# Patient Record
Sex: FEMALE | Race: ASIAN | URBAN - METROPOLITAN AREA
[De-identification: names, ages, dates, MRNs, and addresses within clinical notes are randomized per-mention and may not be internally consistent; named-entity substitution may affect disease eponyms.]

---

## 2021-01-28 ENCOUNTER — OFFICE VISIT (OUTPATIENT)
Dept: URBAN - METROPOLITAN AREA CLINIC 91 | Facility: CLINIC | Age: 81
End: 2021-01-28
Payer: MEDICARE

## 2021-01-28 DIAGNOSIS — H11.151 PINGUECULA, RIGHT EYE: ICD-10-CM

## 2021-01-28 DIAGNOSIS — E11.9 TYPE 2 DIABETES MELLITUS WITHOUT COMPLICATIONS: ICD-10-CM

## 2021-01-28 PROCEDURE — 99212 OFFICE O/P EST SF 10 MIN: CPT | Performed by: SPECIALIST

## 2021-01-28 RX ORDER — TIMOLOL MALEATE 5 MG/ML
0.5 % SOLUTION/ DROPS OPHTHALMIC
Qty: 7.5 | Refills: 3 | Status: ACTIVE
Start: 2021-01-28

## 2021-01-28 RX ORDER — LATANOPROST 50 UG/ML
0.005 % SOLUTION OPHTHALMIC
Qty: 7.5 | Refills: 3 | Status: ACTIVE
Start: 2021-01-28

## 2021-01-28 RX ORDER — LATANOPROST 50 UG/ML
0.005 % SOLUTION OPHTHALMIC 4 TIMES DAILY
Refills: 0 | Status: INACTIVE
Start: 2021-01-28 | End: 2021-01-28

## 2021-01-28 RX ORDER — TIMOLOL MALEATE 5 MG/ML
0.5 % SOLUTION/ DROPS OPHTHALMIC
Qty: 7.5 | Refills: 3 | Status: INACTIVE
Start: 2021-01-28 | End: 2021-01-28

## 2021-01-28 ASSESSMENT — VISUAL ACUITY: OD: 20/40

## 2021-01-28 ASSESSMENT — INTRAOCULAR PRESSURE
OD: 12
OS: 11

## 2021-01-28 NOTE — IMPRESSION/PLAN
Impression: Dry eye syndrome of bilateral lacrimal glands: H04.123. Plan: Slight blurred vision most likely due to dry eye explained. For dry eye syndrome, I have recommended patient use a good quality artificial tear 4-6 times per day. I have also discussed alternatives such as placement of punctal plugs and topical Restasis drops.

## 2021-01-28 NOTE — IMPRESSION/PLAN
Impression: Type 2 diabetes mellitus without complications: K50.9. Plan: DM without signs of diabetic retinopathy. Diabetic eye disease and importance of regular eye exams and tight glucose control discussed.

## 2021-01-28 NOTE — IMPRESSION/PLAN
Impression: Pinguecula, right eye: H11.151. Plan: For Pinguecula OD I have recommended UV Protection when outdoors, either a hat or UV rated sunglasses. I explained to patient that these areas are more easily irritated by wind and sun exposure. Patient should also use a good quality artificial tears as needed for irritation.

## 2021-01-28 NOTE — IMPRESSION/PLAN
Impression: Primary open-angle glaucoma, bilateral, mild stage: H40.1131. Plan: Continue Latanoprost OU QHS + Timolol OU BID. Discussed primary open angle glaucoma. I have stressed importance of patient compliance with follow-up appointments and using glaucoma drops as directed. I have explained the risk of irreversible vision loss and possible blindness associated with glaucoma. Will continue to monitor patient's glaucoma status with OCTg, visual field testing and fundus photography. 


RV in 4 months for [facility] and OCTg exam.

## 2021-05-27 ENCOUNTER — OFFICE VISIT (OUTPATIENT)
Dept: URBAN - METROPOLITAN AREA CLINIC 91 | Facility: CLINIC | Age: 81
End: 2021-05-27
Payer: MEDICARE

## 2021-05-27 PROCEDURE — 92133 CPTRZD OPH DX IMG PST SGM ON: CPT | Performed by: SPECIALIST

## 2021-05-27 PROCEDURE — 99214 OFFICE O/P EST MOD 30 MIN: CPT | Performed by: SPECIALIST

## 2021-05-27 ASSESSMENT — INTRAOCULAR PRESSURE
OD: 11
OS: 12

## 2021-05-27 NOTE — IMPRESSION/PLAN
Impression: Primary open-angle glaucoma, bilateral, mild stage: H40.1131. Plan: IOP and OCTg are stable. Continue Latanoprost QHS OU and Timolol BID OU. Discussed primary open angle glaucoma. I have stressed importance of patient compliance with follow-up appointments and using glaucoma drops as directed. I have explained the risk of irreversible vision loss and possible blindness associated with glaucoma. Will continue to monitor patient's glaucoma status with OCTg, visual field testing and fundus photography.

## 2021-05-27 NOTE — IMPRESSION/PLAN
Impression: Type 2 diabetes mellitus without complications: L26.8. Plan: DM without signs of diabetic retinopathy. Diabetic eye disease and importance of regular eye exams and tight glucose control discussed.

## 2021-12-02 ENCOUNTER — OFFICE VISIT (OUTPATIENT)
Dept: URBAN - METROPOLITAN AREA CLINIC 91 | Facility: CLINIC | Age: 81
End: 2021-12-02
Payer: MEDICARE

## 2021-12-02 PROCEDURE — 92083 EXTENDED VISUAL FIELD XM: CPT | Performed by: SPECIALIST

## 2021-12-02 PROCEDURE — 99213 OFFICE O/P EST LOW 20 MIN: CPT | Performed by: SPECIALIST

## 2021-12-02 ASSESSMENT — INTRAOCULAR PRESSURE
OD: 18
OS: 15

## 2021-12-02 ASSESSMENT — VISUAL ACUITY
OS: 20/25
OD: 20/40

## 2021-12-02 NOTE — IMPRESSION/PLAN
Impression: Primary open-angle glaucoma, bilateral, mild stage: H40.1131. Plan: Borderline IOP's will recheck in 3 months if no improvement will consider changing glaucoma drops. VF No change. Handwritten Rx given to patient as requested. Discussed primary open angle glaucoma. I have stressed importance of patient compliance with follow-up appointments and using glaucoma drops as directed. I have explained the risk of irreversible vision loss and possible blindness associated with glaucoma. Will continue to monitor patient's glaucoma status with OCTg, visual field testing and fundus photography.

## 2022-03-03 ENCOUNTER — OFFICE VISIT (OUTPATIENT)
Dept: URBAN - METROPOLITAN AREA CLINIC 91 | Facility: CLINIC | Age: 82
End: 2022-03-03
Payer: MEDICARE

## 2022-03-03 DIAGNOSIS — H04.123 DRY EYE SYNDROME OF BILATERAL LACRIMAL GLANDS: ICD-10-CM

## 2022-03-03 DIAGNOSIS — H40.1131 PRIMARY OPEN-ANGLE GLAUCOMA, BILATERAL, MILD STAGE: Primary | ICD-10-CM

## 2022-03-03 PROCEDURE — 99214 OFFICE O/P EST MOD 30 MIN: CPT | Performed by: SPECIALIST

## 2022-03-03 PROCEDURE — 92133 CPTRZD OPH DX IMG PST SGM ON: CPT | Performed by: SPECIALIST

## 2022-03-03 ASSESSMENT — INTRAOCULAR PRESSURE
OD: 12
OS: 14

## 2022-03-03 ASSESSMENT — VISUAL ACUITY
OD: 20/40
OS: 20/25

## 2022-03-03 NOTE — IMPRESSION/PLAN
Impression: Primary open-angle glaucoma, bilateral, mild stage: H40.1131. Plan: OCTg and IOP considered stable compared to past OV's, continue Timolol OU BID and Latanoprost OU QHS. Discussed primary open angle glaucoma. I have stressed importance of patient compliance with follow-up appointments and using glaucoma drops as directed. I have explained the risk of irreversible vision loss and possible blindness associated with glaucoma. Will continue to monitor patient's glaucoma status with OCTg, visual field testing and fundus photography.

## 2022-03-03 NOTE — IMPRESSION/PLAN
Impression: Type 2 diabetes mellitus without complications: L83.0. Plan: DM without signs of diabetic retinopathy. Diabetic eye disease and importance of regular eye exams and tight glucose control discussed.

## 2022-03-03 NOTE — IMPRESSION/PLAN
Impression: Pinguecula, right eye: H11.151. Plan: For Pinguecula I have recommended UV Protection when outdoors, either a hat or UV rated sunglasses. I explained to patient that these areas are more easily irritated by wind and sun exposure. Patient should also use a good quality artificial tears as needed for irritation.

## 2022-06-02 ENCOUNTER — OFFICE VISIT (OUTPATIENT)
Dept: URBAN - METROPOLITAN AREA CLINIC 91 | Facility: CLINIC | Age: 82
End: 2022-06-02
Payer: MEDICARE

## 2022-06-02 DIAGNOSIS — H40.1131 PRIMARY OPEN-ANGLE GLAUCOMA, BILATERAL, MILD STAGE: Primary | ICD-10-CM

## 2022-06-02 PROCEDURE — 99212 OFFICE O/P EST SF 10 MIN: CPT | Performed by: SPECIALIST

## 2022-06-02 ASSESSMENT — VISUAL ACUITY
OD: 20/40
OS: 20/25

## 2022-06-02 ASSESSMENT — INTRAOCULAR PRESSURE
OD: 11
OS: 15

## 2022-06-02 NOTE — IMPRESSION/PLAN
Impression: Primary open-angle glaucoma, bilateral, mild stage: H40.1131. Plan: FFg and IOP considered stable compared to past OV's, continue Timolol OU BID and Latanoprost OU QHS. Discussed primary open angle glaucoma. I have stressed importance of patient compliance with follow-up appointments and using glaucoma drops as directed. I have explained the risk of irreversible vision loss and possible blindness associated with glaucoma.  Will continue to monitor patient's glaucoma status with OCTg, visual field all

## 2023-03-06 ENCOUNTER — OFFICE VISIT (OUTPATIENT)
Dept: URBAN - METROPOLITAN AREA CLINIC 91 | Facility: CLINIC | Age: 83
End: 2023-03-06
Payer: MEDICARE

## 2023-03-06 DIAGNOSIS — H40.1131 PRIMARY OPEN-ANGLE GLAUCOMA, BILATERAL, MILD STAGE: Primary | ICD-10-CM

## 2023-03-06 DIAGNOSIS — E11.9 TYPE 2 DIABETES MELLITUS WITHOUT COMPLICATIONS: ICD-10-CM

## 2023-03-06 PROCEDURE — 99213 OFFICE O/P EST LOW 20 MIN: CPT | Performed by: SPECIALIST

## 2023-03-06 PROCEDURE — 92083 EXTENDED VISUAL FIELD XM: CPT | Performed by: SPECIALIST

## 2023-03-06 ASSESSMENT — VISUAL ACUITY: OD: 20/30

## 2023-03-06 ASSESSMENT — INTRAOCULAR PRESSURE
OS: 15
OD: 14

## 2023-03-06 NOTE — IMPRESSION/PLAN
Impression: Type 2 diabetes mellitus without complications: W94.8. Plan: DM without signs of diabetic retinopathy. Diabetic eye disease and importance of regular eye exams and tight glucose control discussed.

## 2023-10-23 ENCOUNTER — OFFICE VISIT (OUTPATIENT)
Dept: URBAN - METROPOLITAN AREA CLINIC 91 | Facility: CLINIC | Age: 83
End: 2023-10-23
Payer: MEDICARE

## 2023-10-23 DIAGNOSIS — H40.1131 PRIMARY OPEN-ANGLE GLAUCOMA, BILATERAL, MILD STAGE: Primary | ICD-10-CM

## 2023-10-23 PROCEDURE — 76514 ECHO EXAM OF EYE THICKNESS: CPT | Performed by: SPECIALIST

## 2023-10-23 PROCEDURE — 99213 OFFICE O/P EST LOW 20 MIN: CPT | Performed by: SPECIALIST

## 2023-10-23 RX ORDER — DORZOLAMIDE HCL 20 MG/ML
2 % SOLUTION/ DROPS OPHTHALMIC
Qty: 15 | Refills: 3 | Status: ACTIVE
Start: 2023-10-23

## 2023-10-23 ASSESSMENT — INTRAOCULAR PRESSURE
OD: 14
OS: 15

## 2023-10-23 ASSESSMENT — VISUAL ACUITY: OD: 20/30

## 2024-05-28 ENCOUNTER — OFFICE VISIT (OUTPATIENT)
Dept: URBAN - METROPOLITAN AREA CLINIC 91 | Facility: CLINIC | Age: 84
End: 2024-05-28
Payer: MEDICARE

## 2024-05-28 DIAGNOSIS — H04.123 DRY EYE SYNDROME OF BILATERAL LACRIMAL GLANDS: ICD-10-CM

## 2024-05-28 DIAGNOSIS — H40.1131 PRIMARY OPEN-ANGLE GLAUCOMA, BILATERAL, MILD STAGE: Primary | ICD-10-CM

## 2024-05-28 PROCEDURE — 99213 OFFICE O/P EST LOW 20 MIN: CPT | Performed by: SPECIALIST

## 2024-05-28 PROCEDURE — 92083 EXTENDED VISUAL FIELD XM: CPT | Performed by: SPECIALIST

## 2024-05-28 ASSESSMENT — INTRAOCULAR PRESSURE
OD: 13
OS: 14

## 2024-12-02 ENCOUNTER — OFFICE VISIT (OUTPATIENT)
Dept: URBAN - METROPOLITAN AREA CLINIC 91 | Facility: CLINIC | Age: 84
End: 2024-12-02
Payer: MEDICARE

## 2024-12-02 DIAGNOSIS — E11.9 TYPE 2 DIABETES MELLITUS WITHOUT COMPLICATIONS: ICD-10-CM

## 2024-12-02 DIAGNOSIS — H40.1131 PRIMARY OPEN-ANGLE GLAUCOMA, BILATERAL, MILD STAGE: Primary | ICD-10-CM

## 2024-12-02 DIAGNOSIS — H04.123 DRY EYE SYNDROME OF BILATERAL LACRIMAL GLANDS: ICD-10-CM

## 2024-12-02 PROCEDURE — 92133 CPTRZD OPH DX IMG PST SGM ON: CPT | Performed by: SPECIALIST

## 2024-12-02 PROCEDURE — 99214 OFFICE O/P EST MOD 30 MIN: CPT | Performed by: SPECIALIST

## 2024-12-02 PROCEDURE — 83861 MICROFLUID ANALY TEARS: CPT | Performed by: SPECIALIST

## 2024-12-02 ASSESSMENT — INTRAOCULAR PRESSURE
OS: 14
OD: 7

## 2025-06-23 ENCOUNTER — OFFICE VISIT (OUTPATIENT)
Facility: LOCATION | Age: 85
End: 2025-06-23
Payer: MEDICARE

## 2025-06-23 DIAGNOSIS — H40.1131 PRIMARY OPEN-ANGLE GLAUCOMA, BILATERAL, MILD STAGE: Primary | ICD-10-CM

## 2025-06-23 PROCEDURE — 92083 EXTENDED VISUAL FIELD XM: CPT | Performed by: SPECIALIST

## 2025-06-23 PROCEDURE — 99213 OFFICE O/P EST LOW 20 MIN: CPT | Performed by: SPECIALIST

## 2025-06-23 RX ORDER — PREDNISOLONE ACETATE 10 MG/ML
1 % SUSPENSION/ DROPS OPHTHALMIC
Qty: 10 | Refills: 1 | Status: ACTIVE
Start: 2025-06-23

## 2025-06-23 RX ORDER — MOXIFLOXACIN HYDROCHLORIDE 5 MG/ML
0.5 % SOLUTION/ DROPS OPHTHALMIC
Qty: 5 | Refills: 1 | Status: ACTIVE
Start: 2025-06-23

## 2025-06-23 ASSESSMENT — INTRAOCULAR PRESSURE
OD: 7
OS: 17

## 2025-08-12 ENCOUNTER — Encounter (OUTPATIENT)
Facility: LOCATION | Age: 85
End: 2025-08-12
Payer: MEDICARE

## 2025-08-12 ENCOUNTER — PROCEDURE (OUTPATIENT)
Facility: LOCATION | Age: 85
End: 2025-08-12
Payer: MEDICARE

## 2025-08-12 PROCEDURE — 0660T IMPLT ANT SGM IO NBIO RX SYS: CPT | Performed by: SPECIALIST

## 2025-08-13 ENCOUNTER — POST-OPERATIVE VISIT (OUTPATIENT)
Facility: LOCATION | Age: 85
End: 2025-08-13
Payer: MEDICARE

## 2025-08-13 DIAGNOSIS — H40.1131 PRIMARY OPEN-ANGLE GLAUCOMA, BILATERAL, MILD STAGE: ICD-10-CM

## 2025-08-13 DIAGNOSIS — Z48.810 ENCOUNTER FOR SURGICAL AFTERCARE FOLLOWING SURGERY ON A SENSE ORGAN: Primary | ICD-10-CM

## 2025-08-13 PROCEDURE — 99024 POSTOP FOLLOW-UP VISIT: CPT | Performed by: OPTOMETRIST

## 2025-08-13 ASSESSMENT — INTRAOCULAR PRESSURE
OS: 16
OD: 7